# Patient Record
Sex: FEMALE | Race: WHITE | NOT HISPANIC OR LATINO | Employment: FULL TIME | URBAN - METROPOLITAN AREA
[De-identification: names, ages, dates, MRNs, and addresses within clinical notes are randomized per-mention and may not be internally consistent; named-entity substitution may affect disease eponyms.]

---

## 2023-11-27 ENCOUNTER — OFFICE VISIT (OUTPATIENT)
Dept: OBSTETRICS AND GYNECOLOGY | Facility: CLINIC | Age: 53
End: 2023-11-27
Payer: COMMERCIAL

## 2023-11-27 VITALS — DIASTOLIC BLOOD PRESSURE: 64 MMHG | WEIGHT: 210 LBS | SYSTOLIC BLOOD PRESSURE: 110 MMHG | BODY MASS INDEX: 34.95 KG/M2

## 2023-11-27 DIAGNOSIS — N95.0 PMB (POSTMENOPAUSAL BLEEDING): Primary | ICD-10-CM

## 2023-11-27 PROCEDURE — 88305 TISSUE EXAM BY PATHOLOGIST: CPT | Performed by: PATHOLOGY

## 2023-11-27 PROCEDURE — 99213 OFFICE O/P EST LOW 20 MIN: CPT | Performed by: OBSTETRICS & GYNECOLOGY

## 2023-11-27 PROCEDURE — 58100 BIOPSY OF UTERUS LINING: CPT | Performed by: OBSTETRICS & GYNECOLOGY

## 2023-11-27 PROCEDURE — 88305 TISSUE EXAM BY PATHOLOGIST: CPT

## 2023-11-27 ASSESSMENT — ENCOUNTER SYMPTOMS
UNEXPECTED WEIGHT CHANGE: 0
APPETITE CHANGE: 0
FLANK PAIN: 0
ABDOMINAL PAIN: 0
NAUSEA: 0
DYSURIA: 0
FATIGUE: 0
SLEEP DISTURBANCE: 0
FREQUENCY: 0
VOMITING: 0
BLOOD IN STOOL: 0
SHORTNESS OF BREATH: 0
COLOR CHANGE: 0
CONSTIPATION: 0
CHILLS: 0
DIARRHEA: 0
ABDOMINAL DISTENTION: 0
FEVER: 0
BACK PAIN: 0
HEMATURIA: 0

## 2023-11-27 ASSESSMENT — PAIN SCALES - GENERAL: PAINLEVEL: 0-NO PAIN

## 2023-11-27 NOTE — PROGRESS NOTES
Leyla Barillas is a 52 y.o.  here for vaginal bleeding / postmenopausal bleeding    HPI:  Pt has h/o LCIS which was diagnosed after breast reconstruction surgery. She has been on tamoxifen for 5 years.  Recently stopped in May.  Last week had one day of light bleeding and then this weekend has had 3 days like a full period.  Denies pain or other change.     Menopause . No change to menopausal symptoms.  LMP .       Past med hx and past surg hx reviewed and notable for: nerve impingement over the summer - s/p oral prednisone and gabapentin      Obstetric History  OB History    Para Term  AB Living   4 4 4     4   SAB IAB Ectopic Multiple Live Births           4      # Outcome Date GA Lbr Josh/2nd Weight Sex Delivery Anes PTL Lv   4 Term 06    F Vag-Spont   TOSHIA   3 Term 03    M Vag-Spont   TOSHIA   2 Term 00    F Vag-Spont   TOSHIA   1 Term 98    M Vag-Spont   TOSHIA        Past Medical History  She has no past medical history on file.    Surgical History  She has a past surgical history that includes Other surgical history (2015); Tonsillectomy (2015); Foot surgery (2015); and Breast surgery (2018).     Social History  She reports that she has never smoked. She has never used smokeless tobacco. She reports current alcohol use. She reports that she does not use drugs.    Family History  Family History   Problem Relation Name Age of Onset    Diabetes Mother      Breast cancer Paternal Grandmother           /64   Wt 95.3 kg (210 lb)   BMI 34.95 kg/m²   No LMP recorded.    Review of Systems   Constitutional:  Negative for appetite change, chills, fatigue, fever and unexpected weight change.   Respiratory:  Negative for shortness of breath.    Cardiovascular:  Negative for chest pain.   Gastrointestinal:  Negative for abdominal distention, abdominal pain, blood in stool, constipation, diarrhea, nausea and vomiting.   Endocrine: Negative for cold  intolerance and heat intolerance.   Genitourinary:  Positive for vaginal bleeding. Negative for dyspareunia, dysuria, flank pain, frequency, genital sores, hematuria, menstrual problem, pelvic pain, urgency, vaginal discharge and vaginal pain.   Musculoskeletal:  Negative for back pain.   Skin:  Negative for color change.   Psychiatric/Behavioral:  Negative for sleep disturbance.        Physical Exam  Constitutional:       Appearance: Normal appearance. She is normal weight.   Abdominal:      General: Abdomen is flat.      Palpations: Abdomen is soft.      Tenderness: There is no abdominal tenderness.   Genitourinary:     General: Normal vulva.      Vagina: Bleeding present.      Cervix: Normal.   Neurological:      Mental Status: She is alert.   Psychiatric:         Mood and Affect: Mood normal.         Behavior: Behavior normal.         Thought Content: Thought content normal.         Judgment: Judgment normal.       Patient ID: Leyla Barillas is a 52 y.o. female.    Endometrial biopsy    Date/Time: 11/27/2023 9:30 AM    Performed by: Erendira FITCH MD  Authorized by: Erendira FITCH MD    Consent:     Consent obtained: written    Consent given by: patient    Risks discussed: bleeding, infection and pain    Alternatives discussed: observation    Patient agrees, verbalizes understanding, and wants to proceed: yes    Indications:     Indications: postmenopausal bleeding      Progression of post-menopausal bleeding: worsening  Pre-procedure:     Urine pregnancy test: N/A    Procedure:     Tenaculum used: yes      Cervix dilated: no    Findings:     Cervix: normal      Uterus depth by sound (cm): 9    Specimen collected: specimen collected and sent to pathology    Comments:     Procedure comments: Specimen with significant amount of blood d/t ongoing vaginal bleeding      Assessment and Plan:    PMB   Pt is s/p tamoxifen and at increased risk of uterine cancer.   Discussed possible findings (hyperplasia, benign  polyp, atrophy (unlikely) or endometrial cancer). Recommend biopsy. Pt agreeable and wanted to move forward with biopsy today.   Biopsy easily completed.    TATV US ordered to assess for polyps.  Will follow up on biopsy and imaging and discuss plan of care with patient.     12/14/23 - Reviewed pathology results with patient.  Recommend hysteroscopy, D&C for further evaluation. Pt agreeable. Will work on scheduling.

## 2023-11-28 ENCOUNTER — HOSPITAL ENCOUNTER (OUTPATIENT)
Dept: RADIOLOGY | Facility: HOSPITAL | Age: 53
Discharge: HOME | End: 2023-11-28
Payer: COMMERCIAL

## 2023-11-28 DIAGNOSIS — N95.0 PMB (POSTMENOPAUSAL BLEEDING): ICD-10-CM

## 2023-11-28 PROCEDURE — 76830 TRANSVAGINAL US NON-OB: CPT | Performed by: RADIOLOGY

## 2023-11-28 PROCEDURE — 76856 US EXAM PELVIC COMPLETE: CPT

## 2023-11-28 PROCEDURE — 76857 US EXAM PELVIC LIMITED: CPT | Performed by: RADIOLOGY

## 2023-12-07 DIAGNOSIS — Z86.000 HISTORY OF LOBULAR CARCINOMA IN SITU (LCIS) OF BREAST: Primary | ICD-10-CM

## 2023-12-11 ENCOUNTER — ANCILLARY PROCEDURE (OUTPATIENT)
Dept: RADIOLOGY | Facility: CLINIC | Age: 53
End: 2023-12-11
Payer: COMMERCIAL

## 2023-12-11 DIAGNOSIS — D05.01 LOBULAR CARCINOMA IN SITU OF RIGHT BREAST: ICD-10-CM

## 2023-12-11 DIAGNOSIS — R92.8 OTHER ABNORMAL AND INCONCLUSIVE FINDINGS ON DIAGNOSTIC IMAGING OF BREAST: Primary | ICD-10-CM

## 2023-12-11 PROCEDURE — 2550000001 HC RX 255 CONTRASTS: Performed by: NURSE PRACTITIONER

## 2023-12-11 PROCEDURE — 77049 MRI BREAST C-+ W/CAD BI: CPT

## 2023-12-11 PROCEDURE — A9575 INJ GADOTERATE MEGLUMI 0.1ML: HCPCS | Performed by: NURSE PRACTITIONER

## 2023-12-11 PROCEDURE — 77049 MRI BREAST C-+ W/CAD BI: CPT | Mod: BILATERAL PROCEDURE | Performed by: STUDENT IN AN ORGANIZED HEALTH CARE EDUCATION/TRAINING PROGRAM

## 2023-12-11 RX ORDER — GADOTERATE MEGLUMINE 376.9 MG/ML
20 INJECTION INTRAVENOUS
Status: COMPLETED | OUTPATIENT
Start: 2023-12-11 | End: 2023-12-11

## 2023-12-11 RX ADMIN — GADOTERATE MEGLUMINE 20 ML: 376.9 INJECTION INTRAVENOUS at 12:50

## 2023-12-12 LAB
LABORATORY COMMENT REPORT: NORMAL
PATH REPORT.FINAL DX SPEC: NORMAL
PATH REPORT.GROSS SPEC: NORMAL
PATH REPORT.RELEVANT HX SPEC: NORMAL
PATH REPORT.TOTAL CANCER: NORMAL

## 2023-12-14 ENCOUNTER — PREP FOR PROCEDURE (OUTPATIENT)
Dept: OBSTETRICS AND GYNECOLOGY | Facility: HOSPITAL | Age: 53
End: 2023-12-14
Payer: COMMERCIAL

## 2023-12-14 DIAGNOSIS — N95.0 POSTMENOPAUSAL BLEEDING: Primary | ICD-10-CM

## 2023-12-20 PROBLEM — N95.0 POSTMENOPAUSAL BLEEDING: Status: ACTIVE | Noted: 2023-12-14

## 2023-12-26 ENCOUNTER — LAB (OUTPATIENT)
Dept: LAB | Facility: LAB | Age: 53
End: 2023-12-26
Payer: COMMERCIAL

## 2023-12-26 DIAGNOSIS — N95.0 POSTMENOPAUSAL BLEEDING: ICD-10-CM

## 2023-12-26 LAB
ERYTHROCYTE [DISTWIDTH] IN BLOOD BY AUTOMATED COUNT: 13.3 % (ref 11.5–14.5)
HCT VFR BLD AUTO: 39.2 % (ref 36–46)
HGB BLD-MCNC: 13.3 G/DL (ref 12–16)
MCH RBC QN AUTO: 32.1 PG (ref 26–34)
MCHC RBC AUTO-ENTMCNC: 33.9 G/DL (ref 32–36)
MCV RBC AUTO: 95 FL (ref 80–100)
NRBC BLD-RTO: 0 /100 WBCS (ref 0–0)
PLATELET # BLD AUTO: 235 X10*3/UL (ref 150–450)
RBC # BLD AUTO: 4.14 X10*6/UL (ref 4–5.2)
WBC # BLD AUTO: 6.1 X10*3/UL (ref 4.4–11.3)

## 2023-12-26 PROCEDURE — 85027 COMPLETE CBC AUTOMATED: CPT

## 2023-12-26 PROCEDURE — 36415 COLL VENOUS BLD VENIPUNCTURE: CPT

## 2023-12-27 ENCOUNTER — PREP FOR PROCEDURE (OUTPATIENT)
Dept: OBSTETRICS AND GYNECOLOGY | Facility: HOSPITAL | Age: 53
End: 2023-12-27

## 2023-12-27 ENCOUNTER — OFFICE VISIT (OUTPATIENT)
Dept: OBSTETRICS AND GYNECOLOGY | Facility: CLINIC | Age: 53
End: 2023-12-27
Payer: COMMERCIAL

## 2023-12-27 VITALS
BODY MASS INDEX: 35.51 KG/M2 | WEIGHT: 208 LBS | SYSTOLIC BLOOD PRESSURE: 104 MMHG | HEIGHT: 64 IN | DIASTOLIC BLOOD PRESSURE: 71 MMHG

## 2023-12-27 DIAGNOSIS — N95.0 PMB (POSTMENOPAUSAL BLEEDING): ICD-10-CM

## 2023-12-27 DIAGNOSIS — R93.89 THICKENED ENDOMETRIUM: Primary | ICD-10-CM

## 2023-12-27 PROCEDURE — 1036F TOBACCO NON-USER: CPT | Performed by: OBSTETRICS & GYNECOLOGY

## 2023-12-27 PROCEDURE — 99213 OFFICE O/P EST LOW 20 MIN: CPT | Performed by: OBSTETRICS & GYNECOLOGY

## 2023-12-27 RX ORDER — MELOXICAM 15 MG/1
15 TABLET ORAL AS NEEDED
COMMUNITY

## 2023-12-27 ASSESSMENT — ENCOUNTER SYMPTOMS
FLANK PAIN: 0
DIARRHEA: 0
FREQUENCY: 0
HEMATURIA: 0
BLOOD IN STOOL: 0
BACK PAIN: 0
DYSURIA: 0
VOMITING: 0
CONSTIPATION: 0
SHORTNESS OF BREATH: 0
ABDOMINAL PAIN: 0
FEVER: 0
SLEEP DISTURBANCE: 0
UNEXPECTED WEIGHT CHANGE: 0
ABDOMINAL DISTENTION: 0
FATIGUE: 0
APPETITE CHANGE: 0
NAUSEA: 0
CHILLS: 0
COLOR CHANGE: 0

## 2023-12-27 ASSESSMENT — PAIN SCALES - GENERAL: PAINLEVEL: 0-NO PAIN

## 2023-12-27 NOTE — PROGRESS NOTES
"Leyla Barillas is a 53 y.o.  here for pre op visit     HPI: Pt has h/o LCIS which was diagnosed after breast reconstruction surgery. She has been on tamoxifen for 5 years.  Recently stopped in May.  In November had several days of bleeding like a full period.  EMBx the following week showed proliferative endometrium and US showed 16 mm endometrial lining.  Pt has not had a period since .     Today is here to discuss hysteroscopy with excision of tissue in the OR for further evaluation.   Pt reports no problems with anesthesia in the past.     Obstetric History  OB History    Para Term  AB Living   4 4 4     4   SAB IAB Ectopic Multiple Live Births           4      # Outcome Date GA Lbr Josh/2nd Weight Sex Delivery Anes PTL Lv   4 Term 06    F Vag-Spont   TOSHIA   3 Term 03    M Vag-Spont   TOSHIA   2 Term 00    F Vag-Spont   TOSHIA   1 Term 98    M Vag-Spont   TOSHIA        Past Medical History  She has no past medical history on file.    Surgical History  She has a past surgical history that includes Other surgical history (2015); Tonsillectomy (2015); Foot surgery (2015); and Breast surgery (2018).     Social History  She reports that she has never smoked. She has never used smokeless tobacco. She reports current alcohol use. She reports that she does not use drugs.    Family History  Family History   Problem Relation Name Age of Onset    Diabetes Mother      Breast cancer Paternal Grandmother           /71   Ht 1.626 m (5' 4\")   Wt 94.3 kg (208 lb)   LMP 2021   BMI 35.70 kg/m²   Patient's last menstrual period was 2021.    Review of Systems   Constitutional:  Negative for appetite change, chills, fatigue, fever and unexpected weight change.   Respiratory:  Negative for shortness of breath.    Cardiovascular:  Negative for chest pain.   Gastrointestinal:  Negative for abdominal distention, abdominal pain, blood in stool, constipation, " diarrhea, nausea and vomiting.   Endocrine: Negative for cold intolerance and heat intolerance.   Genitourinary:  Negative for dyspareunia, dysuria, flank pain, frequency, genital sores, hematuria, menstrual problem, pelvic pain, urgency, vaginal bleeding, vaginal discharge and vaginal pain.   Musculoskeletal:  Negative for back pain.   Skin:  Negative for color change.   Psychiatric/Behavioral:  Negative for sleep disturbance.        Physical Exam  Constitutional:       Appearance: Normal appearance.   Skin:     General: Skin is warm and dry.   Neurological:      Mental Status: She is alert.   Psychiatric:         Mood and Affect: Mood normal.         Behavior: Behavior normal.           Assessment and Plan:    Discussed hysteroscopy with dilation and curettage procedure - a small camera is passed through cervix into the uterus using normal saline for distention and visualization and at the end a scraping of the lining is  completed for sampling.  Discussed risks including but not limited to, infection, blood loss, uterine perforation.  Pt aware to expect some minor cramping and spotting/bleeding after the procedure which can last up to two weeks.   Pt expressed understanding and all questions answered. Consent was signed.

## 2023-12-30 ENCOUNTER — ANESTHESIA EVENT (OUTPATIENT)
Dept: OPERATING ROOM | Facility: HOSPITAL | Age: 53
End: 2023-12-30
Payer: COMMERCIAL

## 2023-12-30 RX ORDER — OXYCODONE HYDROCHLORIDE 5 MG/1
5 TABLET ORAL EVERY 4 HOURS PRN
Status: CANCELLED | OUTPATIENT
Start: 2023-12-30

## 2023-12-30 RX ORDER — ALBUTEROL SULFATE 0.83 MG/ML
2.5 SOLUTION RESPIRATORY (INHALATION) ONCE AS NEEDED
Status: CANCELLED | OUTPATIENT
Start: 2023-12-30

## 2023-12-30 RX ORDER — ONDANSETRON HYDROCHLORIDE 2 MG/ML
4 INJECTION, SOLUTION INTRAVENOUS ONCE AS NEEDED
Status: CANCELLED | OUTPATIENT
Start: 2023-12-30

## 2023-12-30 RX ORDER — DIPHENHYDRAMINE HYDROCHLORIDE 50 MG/ML
12.5 INJECTION INTRAMUSCULAR; INTRAVENOUS ONCE AS NEEDED
Status: CANCELLED | OUTPATIENT
Start: 2023-12-30

## 2023-12-30 RX ORDER — HYDRALAZINE HYDROCHLORIDE 20 MG/ML
5 INJECTION INTRAMUSCULAR; INTRAVENOUS EVERY 30 MIN PRN
Status: CANCELLED | OUTPATIENT
Start: 2023-12-30

## 2023-12-30 RX ORDER — SODIUM CHLORIDE, SODIUM LACTATE, POTASSIUM CHLORIDE, CALCIUM CHLORIDE 600; 310; 30; 20 MG/100ML; MG/100ML; MG/100ML; MG/100ML
100 INJECTION, SOLUTION INTRAVENOUS CONTINUOUS
Status: CANCELLED | OUTPATIENT
Start: 2023-12-30

## 2024-01-02 ENCOUNTER — ANESTHESIA (OUTPATIENT)
Dept: OPERATING ROOM | Facility: HOSPITAL | Age: 54
End: 2024-01-02
Payer: COMMERCIAL

## 2024-01-02 ENCOUNTER — HOSPITAL ENCOUNTER (OUTPATIENT)
Facility: HOSPITAL | Age: 54
Setting detail: OUTPATIENT SURGERY
Discharge: HOME | End: 2024-01-02
Attending: OBSTETRICS & GYNECOLOGY | Admitting: OBSTETRICS & GYNECOLOGY
Payer: COMMERCIAL

## 2024-01-02 VITALS
HEART RATE: 65 BPM | BODY MASS INDEX: 36.02 KG/M2 | HEIGHT: 64 IN | WEIGHT: 210.98 LBS | DIASTOLIC BLOOD PRESSURE: 61 MMHG | SYSTOLIC BLOOD PRESSURE: 100 MMHG | OXYGEN SATURATION: 97 % | RESPIRATION RATE: 12 BRPM | TEMPERATURE: 97.9 F

## 2024-01-02 DIAGNOSIS — N95.0 POSTMENOPAUSAL BLEEDING: Primary | ICD-10-CM

## 2024-01-02 PROCEDURE — 2500000004 HC RX 250 GENERAL PHARMACY W/ HCPCS (ALT 636 FOR OP/ED): Performed by: NURSE ANESTHETIST, CERTIFIED REGISTERED

## 2024-01-02 PROCEDURE — A58558 PR HYSTEROSCOPY,W/ENDO BX: Performed by: NURSE ANESTHETIST, CERTIFIED REGISTERED

## 2024-01-02 PROCEDURE — 7100000002 HC RECOVERY ROOM TIME - EACH INCREMENTAL 1 MINUTE: Performed by: OBSTETRICS & GYNECOLOGY

## 2024-01-02 PROCEDURE — 88305 TISSUE EXAM BY PATHOLOGIST: CPT | Performed by: STUDENT IN AN ORGANIZED HEALTH CARE EDUCATION/TRAINING PROGRAM

## 2024-01-02 PROCEDURE — 7100000010 HC PHASE TWO TIME - EACH INCREMENTAL 1 MINUTE: Performed by: OBSTETRICS & GYNECOLOGY

## 2024-01-02 PROCEDURE — 88305 TISSUE EXAM BY PATHOLOGIST: CPT | Mod: TC,SUR,AHULAB | Performed by: OBSTETRICS & GYNECOLOGY

## 2024-01-02 PROCEDURE — 2500000005 HC RX 250 GENERAL PHARMACY W/O HCPCS: Performed by: NURSE ANESTHETIST, CERTIFIED REGISTERED

## 2024-01-02 PROCEDURE — A58558 PR HYSTEROSCOPY,W/ENDO BX: Performed by: ANESTHESIOLOGY

## 2024-01-02 PROCEDURE — 58558 HYSTEROSCOPY BIOPSY: CPT | Performed by: OBSTETRICS & GYNECOLOGY

## 2024-01-02 PROCEDURE — 3700000001 HC GENERAL ANESTHESIA TIME - INITIAL BASE CHARGE: Performed by: OBSTETRICS & GYNECOLOGY

## 2024-01-02 PROCEDURE — 3600000008 HC OR TIME - EACH INCREMENTAL 1 MINUTE - PROCEDURE LEVEL THREE: Performed by: OBSTETRICS & GYNECOLOGY

## 2024-01-02 PROCEDURE — 2500000005 HC RX 250 GENERAL PHARMACY W/O HCPCS: Performed by: OBSTETRICS & GYNECOLOGY

## 2024-01-02 PROCEDURE — 2720000007 HC OR 272 NO HCPCS: Performed by: OBSTETRICS & GYNECOLOGY

## 2024-01-02 PROCEDURE — 3600000003 HC OR TIME - INITIAL BASE CHARGE - PROCEDURE LEVEL THREE: Performed by: OBSTETRICS & GYNECOLOGY

## 2024-01-02 PROCEDURE — 7100000001 HC RECOVERY ROOM TIME - INITIAL BASE CHARGE: Performed by: OBSTETRICS & GYNECOLOGY

## 2024-01-02 PROCEDURE — 7100000009 HC PHASE TWO TIME - INITIAL BASE CHARGE: Performed by: OBSTETRICS & GYNECOLOGY

## 2024-01-02 PROCEDURE — 3700000002 HC GENERAL ANESTHESIA TIME - EACH INCREMENTAL 1 MINUTE: Performed by: OBSTETRICS & GYNECOLOGY

## 2024-01-02 RX ORDER — FENTANYL CITRATE 50 UG/ML
INJECTION, SOLUTION INTRAMUSCULAR; INTRAVENOUS AS NEEDED
Status: DISCONTINUED | OUTPATIENT
Start: 2024-01-02 | End: 2024-01-02

## 2024-01-02 RX ORDER — PROPOFOL 10 MG/ML
INJECTION, EMULSION INTRAVENOUS AS NEEDED
Status: DISCONTINUED | OUTPATIENT
Start: 2024-01-02 | End: 2024-01-02

## 2024-01-02 RX ORDER — KETOROLAC TROMETHAMINE 30 MG/ML
INJECTION, SOLUTION INTRAMUSCULAR; INTRAVENOUS AS NEEDED
Status: DISCONTINUED | OUTPATIENT
Start: 2024-01-02 | End: 2024-01-02

## 2024-01-02 RX ORDER — ACETAMINOPHEN 325 MG/1
975 TABLET ORAL ONCE
Status: COMPLETED | OUTPATIENT
Start: 2024-01-02 | End: 2024-01-02

## 2024-01-02 RX ORDER — DEXAMETHASONE SODIUM PHOSPHATE 4 MG/ML
INJECTION, SOLUTION INTRA-ARTICULAR; INTRALESIONAL; INTRAMUSCULAR; INTRAVENOUS; SOFT TISSUE AS NEEDED
Status: DISCONTINUED | OUTPATIENT
Start: 2024-01-02 | End: 2024-01-02

## 2024-01-02 RX ORDER — MIDAZOLAM HYDROCHLORIDE 1 MG/ML
INJECTION INTRAMUSCULAR; INTRAVENOUS AS NEEDED
Status: DISCONTINUED | OUTPATIENT
Start: 2024-01-02 | End: 2024-01-02

## 2024-01-02 RX ORDER — ONDANSETRON HYDROCHLORIDE 2 MG/ML
INJECTION, SOLUTION INTRAVENOUS AS NEEDED
Status: DISCONTINUED | OUTPATIENT
Start: 2024-01-02 | End: 2024-01-02

## 2024-01-02 RX ORDER — LIDOCAINE HYDROCHLORIDE 20 MG/ML
INJECTION, SOLUTION INFILTRATION; PERINEURAL AS NEEDED
Status: DISCONTINUED | OUTPATIENT
Start: 2024-01-02 | End: 2024-01-02

## 2024-01-02 RX ADMIN — PROPOFOL 150 MG: 10 INJECTION, EMULSION INTRAVENOUS at 09:32

## 2024-01-02 RX ADMIN — LIDOCAINE HYDROCHLORIDE 50 MG: 20 INJECTION, SOLUTION INFILTRATION; PERINEURAL at 09:32

## 2024-01-02 RX ADMIN — MIDAZOLAM HYDROCHLORIDE 2 MG: 1 INJECTION, SOLUTION INTRAMUSCULAR; INTRAVENOUS at 09:28

## 2024-01-02 RX ADMIN — DEXAMETHASONE SODIUM PHOSPHATE 4 MG: 4 INJECTION, SOLUTION INTRAMUSCULAR; INTRAVENOUS at 09:42

## 2024-01-02 RX ADMIN — ONDANSETRON 4 MG: 2 INJECTION INTRAMUSCULAR; INTRAVENOUS at 09:42

## 2024-01-02 RX ADMIN — ACETAMINOPHEN 975 MG: 325 TABLET ORAL at 07:50

## 2024-01-02 RX ADMIN — KETOROLAC TROMETHAMINE 30 MG: 30 INJECTION INTRAMUSCULAR; INTRAVENOUS at 10:01

## 2024-01-02 RX ADMIN — FENTANYL CITRATE 100 MCG: 50 INJECTION, SOLUTION INTRAMUSCULAR; INTRAVENOUS at 09:28

## 2024-01-02 RX ADMIN — PROPOFOL 50 MG: 10 INJECTION, EMULSION INTRAVENOUS at 10:01

## 2024-01-02 RX ADMIN — SODIUM CHLORIDE, SODIUM LACTATE, POTASSIUM CHLORIDE, AND CALCIUM CHLORIDE: 600; 310; 30; 20 INJECTION, SOLUTION INTRAVENOUS at 09:28

## 2024-01-02 SDOH — HEALTH STABILITY: MENTAL HEALTH: CURRENT SMOKER: 0

## 2024-01-02 ASSESSMENT — PAIN - FUNCTIONAL ASSESSMENT
PAIN_FUNCTIONAL_ASSESSMENT: 0-10

## 2024-01-02 ASSESSMENT — COLUMBIA-SUICIDE SEVERITY RATING SCALE - C-SSRS
2. HAVE YOU ACTUALLY HAD ANY THOUGHTS OF KILLING YOURSELF?: NO
6. HAVE YOU EVER DONE ANYTHING, STARTED TO DO ANYTHING, OR PREPARED TO DO ANYTHING TO END YOUR LIFE?: NO
1. IN THE PAST MONTH, HAVE YOU WISHED YOU WERE DEAD OR WISHED YOU COULD GO TO SLEEP AND NOT WAKE UP?: NO

## 2024-01-02 ASSESSMENT — PAIN SCALES - GENERAL
PAINLEVEL_OUTOF10: 0 - NO PAIN

## 2024-01-02 NOTE — ANESTHESIA PROCEDURE NOTES
Airway  Date/Time: 1/2/2024 9:35 AM  Urgency: elective    Airway not difficult    Staffing  Performed: CRNA   Authorized by: David Velasquez MD    Performed by: LUCINA Quintanilla-AMALIA  Patient location during procedure: OR    Indications and Patient Condition  Indications for airway management: anesthesia  Spontaneous Ventilation: absent  Sedation level: deep  Preoxygenated: yes  Patient position: sniffing  Mask difficulty assessment: 1 - vent by mask    Final Airway Details  Final airway type: supraglottic airway      Successful airway: Supreme  Size 4     Number of attempts at approach: 2

## 2024-01-02 NOTE — NURSING NOTE
1045: Pt to phase 2 and assisted to get dressed at this time. Pt family called to bedside.    1050: Pt family at bedside and dc instruction reviewed with pt and family. Pt and family expressed understanding of all instruction provided.     1100: Pt assisted to bathroom and able to void at this time.     1105: IV removed and transport arrived for pt discharge.

## 2024-01-02 NOTE — ANESTHESIA PREPROCEDURE EVALUATION
Patient: Leyla Barillas    Procedure Information       Date/Time: 01/02/24 0930    Procedure: Hysteroscopy with Excision Tissue    Location: [facility] OR 01 / [facility] OR    Surgeons: Erendira FITCH MD            Relevant Problems   Anesthesia (within normal limits)      Cardiovascular (within normal limits)      Endocrine (within normal limits)      GI (within normal limits)      /Renal (within normal limits)      Neuro/Psych (within normal limits)      Pulmonary (within normal limits)      GI/Hepatic (within normal limits)      Hematology (within normal limits)      Musculoskeletal (within normal limits)      Eyes, Ears, Nose, and Throat (within normal limits)      Infectious Disease (within normal limits)       Clinical information reviewed:   Tobacco  Allergies  Meds   Med Hx  Surg Hx  OB Status  Fam Hx  Soc   Hx        NPO Detail:  NPO/Void Status  Carbonhydrate Drink Given Prior to Surgery? : N  Date of Last Liquid: 01/01/24  Time of Last Liquid: 2000  Date of Last Solid: 01/01/24  Time of Last Solid: 2000  Last Intake Type: Clear fluids  Time of Last Void: 0724         Physical Exam    Airway  Mallampati: IV  TM distance: >3 FB     Cardiovascular    Dental    Pulmonary    Abdominal      Other findings: Small mouth opening due to TMJ      Anesthesia Plan    ASA 1     general     The patient is not a current smoker.    intravenous induction   Anesthetic plan and risks discussed with patient.    Plan discussed with CRNA.

## 2024-01-02 NOTE — INTERVAL H&P NOTE
"Interval Obstetrical Admission History and Physical    Patient Description:  Leyla Barillas is a 53 y.o.  gravid, female.       Laboratory: I have reviewed pertinent lab studies.     Physical Exam:  /65   Pulse 66   Temp 36.8 °C (98.2 °F) (Temporal)   Resp 16   Ht 1.626 m (5' 4\")   Wt 95.7 kg (210 lb 15.7 oz)   BMI 36.21 kg/m²   Alert and oriented, NAD  Abd soft nontender, nondistended  Normal behavior, normal mood    There is no change in physical condition since the previously submitted Admission History and Physical Examination.    Plans: Plan for hysteroscopy, D&C, possible excision of polyp or other tissue.    All questions have been answered to the patient's satisfaction.      "

## 2024-01-02 NOTE — DISCHARGE SUMMARY
Discharge Diagnosis  Postmenopausal bleeding    Issues Requiring Follow-Up  Postop visit     Test Results Pending At Discharge  Pending Labs       Order Current Status    Surgical Pathology Exam In process            Hospital Course   Procedure as described        Home Medications     Medication List      ASK your doctor about these medications     meloxicam 15 mg tablet; Commonly known as: Mobic       Outpatient Follow-Up  Future Appointments   Date Time Provider Department Center   7/19/2024 10:00 AM [facility] [id number] [facility] [place]   7/29/2024  9:00 AM LUCINA Braswell-CNP [id number] Ken Redmond MD

## 2024-01-02 NOTE — DISCHARGE INSTRUCTIONS
Advance diet as tolerated.   Return to normal activity as tolerated.   No driving for 24 hours after anesthesia.   Shower ok today.  Avoid tub soaks x 1 week.   Avoid intercourse or anything in the vagina x 1 week.   OK to return to work tomorrow 1/3/24.   Call your doctor if you have fever/chills, foul smelling vaginal discharge or vaginal bleeding which soaks through more than one pad per hour.

## 2024-01-02 NOTE — ANESTHESIA POSTPROCEDURE EVALUATION
Patient: Leyla Barillas    Procedure Summary       Date: 01/02/24 Room / Location: [facility] A OR 01 / Virtual [facility] A OR    Anesthesia Start: 0928 Anesthesia Stop: 1012    Procedure: Hysteroscopy with Excision Tissue Diagnosis:       Postmenopausal bleeding      (Postmenopausal bleeding [N95.0])    Surgeons: Erendira FITCH MD Responsible Provider: David Velasquez MD    Anesthesia Type: general ASA Status: 1            Anesthesia Type: general    Vitals Value Taken Time   /60 01/02/24 1030   Temp 36 °C (96.8 °F) 01/02/24 1008   Pulse 72 01/02/24 1030   Resp 16 01/02/24 1030   SpO2 97 % 01/02/24 1030       Anesthesia Post Evaluation    Patient location during evaluation: PACU  Patient participation: complete - patient participated  Level of consciousness: awake  Pain management: adequate  Multimodal analgesia pain management approach  Airway patency: patent  Cardiovascular status: acceptable  Respiratory status: acceptable  Hydration status: acceptable  Postoperative Nausea and Vomiting: none  Comments: Will continue to assess PONV status.    No notable events documented.

## 2024-01-02 NOTE — OP NOTE
Hysteroscopy with Excision Tissue Operative Note     Date: 2024  OR Location: [facility] OR    Name: Leyla Barillas, : 1970, Age: 53 y.o., MRN: [id number], Sex: female    Diagnosis  Pre-op Diagnosis     * Postmenopausal bleeding [N95.0] Post-op Diagnosis     * Postmenopausal bleeding [N95.0]     Procedures  Hysteroscopy with Excision Tissue  03313 - AL HYSTEROSCOPY BX ENDOMETRIUM&/POLYPC W/WO D&C      Surgeons      * Erendira Redmond V - Primary    Resident/Fellow/Other Assistant:  Surgeon(s) and Role:    Procedure Summary  Anesthesia: General  ASA: I  Anesthesia Staff: Anesthesiologist: David Velasquez MD  CRNA: FRANCO LeCRNA; AURY Quintanilla  Estimated Blood Loss: 10 mL  Intra-op Medications:   Medication Name Total Dose   lidocaine-epinephrine PF (Xylocaine W/EPI) 1 %-1:200,000 injection 9 mL              Anesthesia Record               Intraprocedure I/O Totals       None           Specimen:   ID Type Source Tests Collected by Time   1 : ENDOMETRIAL SAMPLING Tissue ENDOMETRIUM CURETTINGS SURGICAL PATHOLOGY EXAM Erendira FITCH MD 2024 1000        Staff:   Circulator: Ela Moy RN  Scrub Person: Esha Wang RN         Drains and/or Catheters: * None in log *    Tourniquet Times:         Implants:     Findings: normal size uterus with large amount of proliferative tissue    Indications: Leyla Barillas is an 53 y.o. female who is having surgery for Postmenopausal bleeding [N95.0].     The patient was seen in the preoperative area. The risks, benefits, complications, treatment options, non-operative alternatives, expected recovery and outcomes were discussed with the patient. The possibilities of reaction to medication, pulmonary aspiration, injury to surrounding structures, bleeding, recurrent infection, the need for additional procedures, failure to diagnose a condition, and creating a complication requiring transfusion or operation were discussed with the patient. The  patient concurred with the proposed plan, giving informed consent.  The site of surgery was properly noted/marked if necessary per policy. The patient has been actively warmed in preoperative area. Preoperative antibiotics are not indicated. Venous thrombosis prophylaxis have been ordered including bilateral sequential compression devices    Procedure Details: Pt was taken to the OR where anesthesia was administered. She was then placed in the dorsal lithotomy position. An exam under anesthesia revealed a normal uterus. She was then prepped and draped in the usual sterile fashion. A weighted speculum was placed in the vagina and the cervix visualized.  A long aaron was used to grasp the anterior lip of the cervix. Hernandez dilators were used to dilate the cervix. The Aveta hysteroscope was placed inside the cervix and advanced to visualize the endometrial cavity using normal saline, which appeared normal in size with large amounts of proliferative tissue. The bilateral ostia were visualized.  The Aveta resection device was then advanced through the hysteroscope and used to resect the endometrial tissue in all areas.  On completion the cavity appeared normal.  Fluid deficit was 100 mL.  The scope and resector were removed.  A sharp curettage was then done until a gritty texture was noted.    The tenaculum was removed and site was hemostatic with minimal pressure.  All instruments were then removed from vagina.  All counts were correct, and the patient tolerated the procedure well.  The patient was taken to PACU in stable condition.    Complications:  None; patient tolerated the procedure well.    Disposition: PACU - hemodynamically stable.  Condition: stable         Additional Details: none    Attending Attestation:     Erendira Redmond V  Phone Number: [phone]

## 2024-01-03 ASSESSMENT — PAIN SCALES - GENERAL: PAINLEVEL_OUTOF10: 0 - NO PAIN

## 2024-01-05 LAB
LABORATORY COMMENT REPORT: NORMAL
PATH REPORT.COMMENTS IMP SPEC: NORMAL
PATH REPORT.FINAL DX SPEC: NORMAL
PATH REPORT.GROSS SPEC: NORMAL
PATH REPORT.RELEVANT HX SPEC: NORMAL
PATH REPORT.TOTAL CANCER: NORMAL

## 2024-01-15 ENCOUNTER — OFFICE VISIT (OUTPATIENT)
Dept: OBSTETRICS AND GYNECOLOGY | Facility: CLINIC | Age: 54
End: 2024-01-15
Payer: COMMERCIAL

## 2024-01-15 VITALS
DIASTOLIC BLOOD PRESSURE: 70 MMHG | WEIGHT: 212 LBS | SYSTOLIC BLOOD PRESSURE: 110 MMHG | BODY MASS INDEX: 36.19 KG/M2 | HEIGHT: 64 IN

## 2024-01-15 DIAGNOSIS — Z09 POSTOP CHECK: Primary | ICD-10-CM

## 2024-01-15 DIAGNOSIS — N85.02 ATYPICAL ENDOMETRIAL HYPERPLASIA: ICD-10-CM

## 2024-01-15 PROCEDURE — 1036F TOBACCO NON-USER: CPT | Performed by: OBSTETRICS & GYNECOLOGY

## 2024-01-15 PROCEDURE — 99213 OFFICE O/P EST LOW 20 MIN: CPT | Performed by: OBSTETRICS & GYNECOLOGY

## 2024-01-15 ASSESSMENT — ENCOUNTER SYMPTOMS
VOMITING: 0
HEMATURIA: 0
APPETITE CHANGE: 0
NAUSEA: 0
SHORTNESS OF BREATH: 0
ABDOMINAL DISTENTION: 0
CHILLS: 0
FEVER: 0
FREQUENCY: 0
BACK PAIN: 0
UNEXPECTED WEIGHT CHANGE: 0
DYSURIA: 0
COLOR CHANGE: 0
SLEEP DISTURBANCE: 0
ABDOMINAL PAIN: 0
CONSTIPATION: 0
FLANK PAIN: 0
DIARRHEA: 0
FATIGUE: 0
BLOOD IN STOOL: 0

## 2024-01-15 ASSESSMENT — PAIN SCALES - GENERAL: PAINLEVEL: 0-NO PAIN

## 2024-01-15 NOTE — PROGRESS NOTES
"Leyla Barillas is a 53 y.o.  here for post op    HPI: Pt is s/p hysteroscopy D&C earlier this month for PMB.  Pathology showed atypical endometrial hyperplasia.   Of significance, she has h/o LCIS which was diagnosed after breast reconstruction surgery.  She was on tamoxifen for 5 years and stopped in .     Pt reports no problems following surgery. Had some light vaginal bleeding for a few days but no other recovery concerns.     Obstetric History  OB History    Para Term  AB Living   4 4 4     4   SAB IAB Ectopic Multiple Live Births           4      # Outcome Date GA Lbr Josh/2nd Weight Sex Delivery Anes PTL Lv   4 Term 06    F Vag-Spont   TOSHIA   3 Term 03    M Vag-Spont   TOSHIA   2 Term 00    F Vag-Spont   TOSHIA   1 Term 98    M Vag-Spont   TOSHIA        Past Medical History  She has a past medical history of Arthritis.    Surgical History  She has a past surgical history that includes Other surgical history (2015); Tonsillectomy (2015); Foot surgery (2015); and Breast surgery (2018).     Social History  She reports that she has never smoked. She has never used smokeless tobacco. She reports current alcohol use. She reports that she does not use drugs.    Family History  Family History   Problem Relation Name Age of Onset    Diabetes Mother      Breast cancer Paternal Grandmother           /70   Ht 1.626 m (5' 4\")   Wt 96.2 kg (212 lb)   LMP 2021   BMI 36.39 kg/m²   Patient's last menstrual period was 2021.    Review of Systems   Constitutional:  Negative for appetite change, chills, fatigue, fever and unexpected weight change.   Respiratory:  Negative for shortness of breath.    Cardiovascular:  Negative for chest pain.   Gastrointestinal:  Negative for abdominal distention, abdominal pain, blood in stool, constipation, diarrhea, nausea and vomiting.   Endocrine: Negative for cold intolerance and heat intolerance.   Genitourinary: "  Negative for dyspareunia, dysuria, flank pain, frequency, genital sores, hematuria, menstrual problem, pelvic pain, urgency, vaginal bleeding, vaginal discharge and vaginal pain.   Musculoskeletal:  Negative for back pain.   Skin:  Negative for color change.   Psychiatric/Behavioral:  Negative for sleep disturbance.        Physical Exam  Constitutional:       Appearance: Normal appearance.   Neurological:      Mental Status: She is alert.   Psychiatric:         Mood and Affect: Mood normal.         Behavior: Behavior normal.           Assessment and Plan:    Surgery follow up  No problems with recovery  Reviewed pathology of atypical endometrial hyperplasia and risk of endometrial cancer (up to 40%).  Recommend hysterectomy for treatment.  Not a good candidate for progesterone therapy due to breast cancer risk, nor is progesterone the first line therapy in this situation.   Reviewed likely minimally invasive approach to surgery.  Recommended several Gyn surgeons in  system. Pt has appointment with Dr. Johnna Oliva 2/1/24.

## 2024-02-01 ENCOUNTER — OFFICE VISIT (OUTPATIENT)
Dept: OBSTETRICS AND GYNECOLOGY | Facility: CLINIC | Age: 54
End: 2024-02-01
Payer: COMMERCIAL

## 2024-02-01 VITALS
HEART RATE: 75 BPM | SYSTOLIC BLOOD PRESSURE: 115 MMHG | DIASTOLIC BLOOD PRESSURE: 80 MMHG | BODY MASS INDEX: 35.51 KG/M2 | WEIGHT: 208 LBS | HEIGHT: 64 IN

## 2024-02-01 DIAGNOSIS — N85.02 EIN (ENDOMETRIAL INTRAEPITHELIAL NEOPLASIA): Primary | ICD-10-CM

## 2024-02-01 DIAGNOSIS — Z86.000 HISTORY OF LOBULAR CARCINOMA IN SITU (LCIS) OF BREAST: ICD-10-CM

## 2024-02-01 DIAGNOSIS — N95.0 POST-MENOPAUSAL BLEEDING: ICD-10-CM

## 2024-02-01 PROCEDURE — 99215 OFFICE O/P EST HI 40 MIN: CPT | Performed by: STUDENT IN AN ORGANIZED HEALTH CARE EDUCATION/TRAINING PROGRAM

## 2024-02-01 PROCEDURE — 1036F TOBACCO NON-USER: CPT | Performed by: STUDENT IN AN ORGANIZED HEALTH CARE EDUCATION/TRAINING PROGRAM

## 2024-02-01 ASSESSMENT — ENCOUNTER SYMPTOMS
MUSCULOSKELETAL NEGATIVE: 0
ENDOCRINE NEGATIVE: 0
GASTROINTESTINAL NEGATIVE: 0
PSYCHIATRIC NEGATIVE: 0
RESPIRATORY NEGATIVE: 0
CONSTITUTIONAL NEGATIVE: 0
NEUROLOGICAL NEGATIVE: 0
ALLERGIC/IMMUNOLOGIC NEGATIVE: 0
CARDIOVASCULAR NEGATIVE: 0
HEMATOLOGIC/LYMPHATIC NEGATIVE: 0
EYES NEGATIVE: 0
ROS GI COMMENTS: LEAKING URINE

## 2024-02-01 ASSESSMENT — PAIN SCALES - GENERAL: PAINLEVEL: 0-NO PAIN

## 2024-02-01 NOTE — PROGRESS NOTES
"Division of Minimally Invasive Gynecologic Surgery  [facility]    24 Gynecology Consult     HISTORY OF PRESENT ILLNESS:  Leyla Barillas 53 y.o. P4 ( x4) referred by Dr. Redmond for atypical endometrial hyperplasia (EIN).     Imaging:   - Pelvic US 2023 showed uterus measuring 11cm x 7cm x 7cm, EMS 16mm, otherwise unremarkable.   Labs:   - Recent CBC WNL   Screening:   - Last pap negative/negative 2023   - Last mammogram 2023 BIRADS 1  - EMC w/ atypical endometrial hyperplasia in a background of disordered proliferative endometrium   - Colonoscopy , 5 year follow up recommended   PMHx: LCIS (on Tamoxifen x 5 years, stopped in May 2023)   PSHx: hysteroscopy/D+C, breast reduction, neuroma removal, Bartholin's cyst, tonsillectomy     PAST MEDICAL HISTORY:  Past Medical History:   Diagnosis Date    Arthritis     History of lobular carcinoma in situ (LCIS) of breast     s/p 5 years of Tamoxifen     PAST SURGICAL HISTORY:  Past Surgical History:   Procedure Laterality Date    BREAST SURGERY  2018    Breast Surgery Reduction Procedure Bilateral    FOOT SURGERY  2015    Foot Repair    HYSTEROSCOPY      OTHER SURGICAL HISTORY  2015    Excision Of Bartholin's Gland Or Cyst    TONSILLECTOMY  2015    Tonsillectomy     PHYSICAL EXAMINATION:  VITAL SIGNS: /80   Pulse 75   Ht 1.626 m (5' 4\")   Wt 94.3 kg (208 lb)   LMP 2021   BMI 35.70 kg/m²      Constitutional:  No acute distress, well-nourished and well-developed  HEENT: EOM grossly intact, MMM, neck supple and with full ROM  Pulm:  Effort normal. No accessory muscle usage.  No respiratory distress.  Neurological:  She is alert and oriented to person place and time.  Skin: Warm, no pallor.  Psychiatric:  She has normal mood and affect.    ASSESSMENT:  Leyla Barillas 53 y.o. P4 ( x4) referred by Dr. Redmond for atypical endometrial hyperplasia (EIN).     We discussed the etiology and " implication of atypical hyperplasia today. We reviewed that while progesterones and regular endometrial sampling are an option, the recommendation in her case would be for total hysterectomy. We discussed the option of ovarian retention vs oophorectomy.     We discussed the standard procedure for laparoscopic hysterectomy, including contained morcellation. We reviewed the risks/benefits/alternatives to surgery. She is aware of the risk of bleeding, infection, and damage to nearby structures, including bladder, ureters, bowel, and vasculature. She is agreeable to blood transfusion if recommended. We discussed the risks/benefits of contained morcellation vs laparotomy, and she is comfortable with proceeding with Pfannenstiel if needed for intact removal. We reviewed the small risk of laparotomy and the fact that fertility following hysterectomy is not an option.     She reports she is meeting w/ a GYN surgeon at [facility] as well and while she plans to have a hysterectomy, she isn't sure yet where she'll go. I expressed that the most important thing is her safety and well being and encouraged her to go wherever she felt safest. If she elects to proceed w/ surgery w/ me, she will let me know    PLAN:  - Considering TL-BS (possible BSO) at  vs [facility]   - Will let me know when she makes a decision     Johnna Oliva MD  02/01/24  9:55 AM

## 2024-07-19 ENCOUNTER — HOSPITAL ENCOUNTER (OUTPATIENT)
Dept: RADIOLOGY | Facility: CLINIC | Age: 54
Discharge: HOME | End: 2024-07-19
Payer: COMMERCIAL

## 2024-07-19 VITALS — HEIGHT: 64 IN | BODY MASS INDEX: 35.49 KG/M2 | WEIGHT: 207.89 LBS

## 2024-07-19 DIAGNOSIS — D05.01 LOBULAR CARCINOMA IN SITU OF RIGHT BREAST: ICD-10-CM

## 2024-07-19 PROCEDURE — 77063 BREAST TOMOSYNTHESIS BI: CPT

## 2024-07-29 ENCOUNTER — OFFICE VISIT (OUTPATIENT)
Dept: HEMATOLOGY/ONCOLOGY | Facility: CLINIC | Age: 54
End: 2024-07-29
Payer: COMMERCIAL

## 2024-07-29 VITALS
SYSTOLIC BLOOD PRESSURE: 118 MMHG | WEIGHT: 208.8 LBS | TEMPERATURE: 98.1 F | DIASTOLIC BLOOD PRESSURE: 82 MMHG | BODY MASS INDEX: 35.82 KG/M2 | HEART RATE: 86 BPM | OXYGEN SATURATION: 97 % | RESPIRATION RATE: 16 BRPM

## 2024-07-29 DIAGNOSIS — Z86.000 HISTORY OF LOBULAR CARCINOMA IN SITU (LCIS) OF BREAST: Primary | ICD-10-CM

## 2024-07-29 PROCEDURE — 1036F TOBACCO NON-USER: CPT | Performed by: NURSE PRACTITIONER

## 2024-07-29 PROCEDURE — 99214 OFFICE O/P EST MOD 30 MIN: CPT | Performed by: NURSE PRACTITIONER

## 2024-07-29 ASSESSMENT — PAIN SCALES - GENERAL: PAINLEVEL: 0-NO PAIN

## 2024-07-29 NOTE — PROGRESS NOTES
"Patient ID: Leyla Barillas is a 53 y.o. female.    Cancer History:      Treatment History:    1. Normal mammogram with tomosynthesis with scattered fibroglandular tissue 6/10/17  2. Breast reduction surgery 11/20/17 that showed right breast focal LCIS in several sections  3. Tamoxifen initiated 3/1/18  4. 12/4/18 LEFT BREAST, NON MASS ENHANCEMENT CENTRAL SUPERIOR MIDDLE DEPTH, MRI GUIDED CORE NEEDLE BIOPSY: -- CALCIFIED FAT NECROSIS.   5. Completed 5 yrs of tamoxifen ~ 5/2023   6. May 2024 hysterectomy and BSO.  Atypical endometrial hyperplasia on preoperative biopsy, benign uterine pathology        Subjective    HPI  Seen in follow up for her history of LCIS.   Had D&C in January that showed \"pre-cancer\". In May, had complete hysterectomy and BSO.     Energy and appetite fine.  Denies persistent or worsening cough.  Denies n/v.    Denies lumps in chest.    Objective    BSA: 2.07 meters squared  /82 (BP Location: Left arm, Patient Position: Sitting)   Pulse 86   Temp 36.7 °C (98.1 °F) (Temporal)   Resp 16   Wt 94.7 kg (208 lb 12.8 oz)   LMP 05/01/2021   SpO2 97%   BMI 35.82 kg/m²      Physical Exam  Vitals reviewed.   Eyes:      General: No scleral icterus.  Cardiovascular:      Rate and Rhythm: Normal rate and regular rhythm.   Pulmonary:      Effort: Pulmonary effort is normal.      Breath sounds: Normal breath sounds.   Abdominal:      General: Bowel sounds are normal. There is no distension.      Palpations: Abdomen is soft.      Tenderness: There is no abdominal tenderness. There is no guarding.   Musculoskeletal:      Right lower leg: No edema.      Left lower leg: No edema.   Lymphadenopathy:      Cervical: No cervical adenopathy.      Comments: No adenopathy head/neck/axilla/clavicular    Post reduction bilaterally  No lumps or nodules appreciated    Skin:     General: Skin is warm and dry.   Neurological:      Mental Status: She is alert and oriented to person, place, and time.   Psychiatric:    "      Mood and Affect: Mood normal.         Behavior: Behavior normal.         Performance Status:  Asymptomatic    BI mammo bilateral screening tomosynthesis    Result Date: 7/25/2024  Impression: No mammographic evidence of malignancy.   BI-RADS CATEGORY: BI-RADS Category:  1 Negative. Recommendation:  Annual Screening. Recommended Date:  1 Year. Laterality:  Bilateral.       For any future breast imaging appointments, please call [phone]).   I personally reviewed the images/study and I agree with Arabella Dominguez DO's (radiology resident) findings as stated. This study was interpreted at [place], [place].   MACRO: None   Signed by: Constantine Conteh 7/25/2024 4:07 PM Dictation workstation:   [id number]    12/11/23 MRI breasts: no evidence of malignancy.       Assessment/Plan   Over 6 yrs out from LCIS.     Recent mammogram benign.     Patient would also like to continue with enhanced screening with MRI breasts. I ordered one for December. Thereafter, patient can follow up with GYN or PCP going forward for mammogram/exam/MRI. She can follow up with me as needed.     Recommended a diet rich in a variety of vegetables. Limit red meat. Limit sugar intake.   Encouraged exercise. Aim for at least 150 min of exercise per week.   Limit alcohol intake to 1 or zero servings per day.      I recommend that she NOT take hormone therapy given history of LCIS (she inquired due to BSO).     Dr. Ana Frazier is her PCP at [facility].  Dr. Redmond [place]yn [place] at .           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

## 2024-11-24 ENCOUNTER — ANCILLARY PROCEDURE (OUTPATIENT)
Dept: URGENT CARE | Age: 54
End: 2024-11-24
Payer: COMMERCIAL

## 2024-11-24 ENCOUNTER — OFFICE VISIT (OUTPATIENT)
Dept: URGENT CARE | Age: 54
End: 2024-11-24
Payer: COMMERCIAL

## 2024-11-24 VITALS
DIASTOLIC BLOOD PRESSURE: 66 MMHG | TEMPERATURE: 98.3 F | BODY MASS INDEX: 34.83 KG/M2 | RESPIRATION RATE: 16 BRPM | OXYGEN SATURATION: 98 % | SYSTOLIC BLOOD PRESSURE: 115 MMHG | HEART RATE: 68 BPM | WEIGHT: 204 LBS | HEIGHT: 64 IN

## 2024-11-24 DIAGNOSIS — S60.222A CONTUSION OF LEFT HAND, INITIAL ENCOUNTER: ICD-10-CM

## 2024-11-24 DIAGNOSIS — S60.222A CONTUSION OF LEFT HAND, INITIAL ENCOUNTER: Primary | ICD-10-CM

## 2024-11-24 PROCEDURE — 73130 X-RAY EXAM OF HAND: CPT | Mod: LEFT SIDE | Performed by: FAMILY MEDICINE

## 2024-11-24 NOTE — PROGRESS NOTES
"Subjective   Patient ID: Leyla Barillas is a 53 y.o. female. They present today with a chief complaint of Injury (Left hand swelling and painful, happened yesterday. ).    History of Present Illness  HPI  Patient presents with injury to her left hand after falling on it at home yesterday.  She states she tripped and fell and landed on her flexed wrist.  Pain is primarily over the dorsal hand with swelling.   is restricted secondary to the swelling and pain.  She denies numbness.  No cuts or abrasions.  She has taken ibuprofen for the discomfort  Past Medical History  Allergies as of 11/24/2024    (No Known Allergies)       (Not in a hospital admission)       Past Medical History:   Diagnosis Date    Arthritis     History of lobular carcinoma in situ (LCIS) of breast     s/p 5 years of Tamoxifen    Lobular carcinoma in situ 11/2017       Past Surgical History:   Procedure Laterality Date    BREAST BIOPSY Left 12/04/2018 2018 left breast benign core biopsy with clip placement    BREAST SURGERY  11/01/2017 2017 Breast Surgery Reduction Procedure Bilateral    FOOT SURGERY  03/19/2015    Foot Repair    HYSTERECTOMY  05/2024    HYSTEROSCOPY      OTHER SURGICAL HISTORY  03/19/2015    Excision Of Bartholin's Gland Or Cyst    REDUCTION MAMMAPLASTY  11/2017    TONSILLECTOMY  03/19/2015    Tonsillectomy        reports that she has never smoked. She has never used smokeless tobacco. She reports current alcohol use. She reports that she does not use drugs.    Review of Systems  Review of Systems     As in history of present illness                          Objective    Vitals:    11/24/24 0806   BP: 115/66   BP Location: Left arm   Patient Position: Sitting   Pulse: 68   Resp: 16   Temp: 36.8 °C (98.3 °F)   TempSrc: Oral   SpO2: 98%   Weight: 92.5 kg (204 lb)   Height: 1.626 m (5' 4\")     Patient's last menstrual period was 05/01/2021.    Physical Exam  General: Vitals noted, no distress. Afebrile.   Vascular: Extremity " warm and dry with good peripheral pulses noted  Extremities: No peripheral edema.  4/5  strength on left compared to right.  Swelling and tenderness noted over dorsal left hand.  Skin: No rash. No bruising or discoloration. No cuts or abrasions  Neuro: No focal neurologic deficits, NIH score of 0.    Procedures    Point of Care Test & Imaging Results from this visit  No results found for this visit on 11/24/24.   No results found.  X-ray of left hand shows no fracture or dislocation.  Diagnostic study results (if any) were reviewed by Benito Cage MD.    Assessment/Plan   Allergies, medications, history, and pertinent labs/EKGs/Imaging reviewed by Benito Cage MD.     Medical Decision Making  At time of discharge patient was clinically well-appearing and HDS for outpatient management. The patient and/or family was educated regarding diagnosis, supportive care, OTC and Rx medications. The patient and/or family was given the opportunity to ask questions prior to discharge.  They verbalized understanding of my discussion of the plans for treatment, expected course, indications to return to  or seek further evaluation in ED, and the need for timely follow up as directed.   They were provided with a work/school excuse if requested.    Orders and Diagnoses  Diagnoses and all orders for this visit:  Contusion of left hand, initial encounter  -     XR hand left 3+ views; Future      Medical Admin Record      Patient disposition: Home    Electronically signed by Benito Cage MD  8:35 AM

## 2024-11-24 NOTE — PATIENT INSTRUCTIONS
Elevate and rest hand as able   apply ice several times a day    Tylenol or ibuprofen as needed for pain  See PCP in 1 to 2 weeks if no improvement

## 2024-12-17 ENCOUNTER — HOSPITAL ENCOUNTER (OUTPATIENT)
Dept: RADIOLOGY | Facility: CLINIC | Age: 54
Discharge: HOME | End: 2024-12-17
Payer: COMMERCIAL

## 2024-12-17 DIAGNOSIS — Z86.000 HISTORY OF LOBULAR CARCINOMA IN SITU (LCIS) OF BREAST: ICD-10-CM

## 2024-12-17 PROCEDURE — 77049 MRI BREAST C-+ W/CAD BI: CPT

## 2024-12-17 PROCEDURE — A9575 INJ GADOTERATE MEGLUMI 0.1ML: HCPCS | Performed by: NURSE PRACTITIONER

## 2024-12-17 PROCEDURE — 77049 MRI BREAST C-+ W/CAD BI: CPT | Performed by: STUDENT IN AN ORGANIZED HEALTH CARE EDUCATION/TRAINING PROGRAM

## 2024-12-17 PROCEDURE — 2550000001 HC RX 255 CONTRASTS: Performed by: NURSE PRACTITIONER

## 2024-12-17 RX ORDER — GADOTERATE MEGLUMINE 376.9 MG/ML
18 INJECTION INTRAVENOUS
Status: COMPLETED | OUTPATIENT
Start: 2024-12-17 | End: 2024-12-17

## 2025-02-21 ENCOUNTER — ANCILLARY PROCEDURE (OUTPATIENT)
Dept: URGENT CARE | Age: 55
End: 2025-02-21
Payer: COMMERCIAL

## 2025-02-21 ENCOUNTER — OFFICE VISIT (OUTPATIENT)
Dept: URGENT CARE | Age: 55
End: 2025-02-21
Payer: COMMERCIAL

## 2025-02-21 VITALS
DIASTOLIC BLOOD PRESSURE: 78 MMHG | HEIGHT: 64 IN | RESPIRATION RATE: 18 BRPM | WEIGHT: 204 LBS | HEART RATE: 68 BPM | SYSTOLIC BLOOD PRESSURE: 121 MMHG | TEMPERATURE: 97.9 F | BODY MASS INDEX: 34.83 KG/M2 | OXYGEN SATURATION: 98 %

## 2025-02-21 DIAGNOSIS — S89.91XA SHIN INJURY, RIGHT, INITIAL ENCOUNTER: ICD-10-CM

## 2025-02-21 DIAGNOSIS — S89.91XA SHIN INJURY, RIGHT, INITIAL ENCOUNTER: Primary | ICD-10-CM

## 2025-02-21 DIAGNOSIS — S80.811A ABRASION OF MULTIPLE SITES OF RIGHT LOWER EXTREMITY, INITIAL ENCOUNTER: ICD-10-CM

## 2025-02-21 PROCEDURE — 73590 X-RAY EXAM OF LOWER LEG: CPT | Mod: RIGHT SIDE | Performed by: FAMILY MEDICINE

## 2025-02-21 RX ORDER — CEPHALEXIN 500 MG/1
500 CAPSULE ORAL 3 TIMES DAILY
Qty: 30 CAPSULE | Refills: 0 | Status: SHIPPED | OUTPATIENT
Start: 2025-02-21 | End: 2025-03-03

## 2025-02-21 ASSESSMENT — PAIN SCALES - GENERAL: PAINLEVEL_OUTOF10: 3

## 2025-02-21 ASSESSMENT — PATIENT HEALTH QUESTIONNAIRE - PHQ9
2. FEELING DOWN, DEPRESSED OR HOPELESS: NOT AT ALL
SUM OF ALL RESPONSES TO PHQ9 QUESTIONS 1 & 2: 0
1. LITTLE INTEREST OR PLEASURE IN DOING THINGS: NOT AT ALL

## 2025-02-21 ASSESSMENT — ENCOUNTER SYMPTOMS: LEG PAIN: 1

## 2025-02-21 NOTE — PROGRESS NOTES
"Subjective   Patient ID: Leyla Barillas is a 54 y.o. female. She presents today with a chief complaint of Leg Pain (Right leg pain/abrasion /Happened today /\"Patient stepped over something so she didn't get sock wet and foot went in to drain\"). Patient presents with right shin pain and abrasions. She fell into her garage drain this morning. She showered and cleansed the wounds afterwards. She is up to date on Tetanus vaccination.    History of Present Illness    Leg Pain         Past Medical History  Allergies as of 02/21/2025    (No Known Allergies)       (Not in a hospital admission)       Past Medical History:   Diagnosis Date    Arthritis     History of lobular carcinoma in situ (LCIS) of breast     s/p 5 years of Tamoxifen    Lobular carcinoma in situ 11/2017       Past Surgical History:   Procedure Laterality Date    BREAST BIOPSY Left 12/04/2018 2018 left breast benign core biopsy with clip placement    BREAST SURGERY  11/01/2017 2017 Breast Surgery Reduction Procedure Bilateral    FOOT SURGERY  03/19/2015    Foot Repair    HYSTERECTOMY  05/2024    HYSTEROSCOPY      OTHER SURGICAL HISTORY  03/19/2015    Excision Of Bartholin's Gland Or Cyst    REDUCTION MAMMAPLASTY  11/2017    TONSILLECTOMY  03/19/2015    Tonsillectomy        reports that she has never smoked. She has never been exposed to tobacco smoke. She has never used smokeless tobacco. She reports current alcohol use. She reports that she does not use drugs.    Review of Systems  Review of Systems                               Objective    Vitals:    02/21/25 1030   BP: 121/78   BP Location: Left arm   Patient Position: Sitting   Pulse: 68   Resp: 18   Temp: 36.6 °C (97.9 °F)   SpO2: 98%   Weight: 92.5 kg (204 lb)   Height: 1.626 m (5' 4\")     Patient's last menstrual period was 05/01/2021.    Physical Exam  Vitals and nursing note reviewed.   Constitutional:       General: She is not in acute distress.     Appearance: Normal appearance. "   Cardiovascular:      Pulses: Normal pulses.   Musculoskeletal:         General: Normal range of motion.   Skin:     General: Skin is warm and dry.      Capillary Refill: Capillary refill takes less than 2 seconds.      Comments: Right anterior tibial surface with multiple abrasions, mild tenderness to palpation with swelling   Neurological:      Mental Status: She is alert.      Sensory: No sensory deficit.         Procedures    Point of Care Test & Imaging Results from this visit  No results found for this visit on 02/21/25.   No results found.    Diagnostic study results (if any) were reviewed by Krista Leon MD.    Assessment/Plan   Allergies, medications, history, and pertinent labs/EKGs/Imaging reviewed by Krista Leon MD.     Medical Decision Making      Orders and Diagnoses  There are no diagnoses linked to this encounter.    Medical Admin Record      Patient disposition: Home    Electronically signed by Krista Leon MD  10:47 AM

## 2025-02-21 NOTE — PATIENT INSTRUCTIONS
Antibiotics as directed; take with food  Wash wounds gently with antibacterial soap and water twice daily, followed by application of antibiotic ointment and dressing  Apply ice as needed  Follow up with new or worsening symptoms

## 2025-10-03 ENCOUNTER — APPOINTMENT (OUTPATIENT)
Facility: CLINIC | Age: 55
End: 2025-10-03
Payer: COMMERCIAL

## (undated) DEVICE — BRIEF, CURITY, XLARGE, MESH

## (undated) DEVICE — ACCESSORY, AVETA, WASTE MANAGEMENT WITH CAP

## (undated) DEVICE — PAD, SANITARY, OBSTETRICAL, W/ADHSV STRIP,11 IN,LF

## (undated) DEVICE — Device

## (undated) DEVICE — GLOVE, SURGICAL, PROTEXIS PI W/NEU-THERA, 6.0, PF, LF

## (undated) DEVICE — GOWN, ASTOUND, L

## (undated) DEVICE — SOLUTION, SCRUB EXIDINE, 4% CHG, 4 OZ

## (undated) DEVICE — ACCESSORY, FLUID MANAGEMENT, AVETA

## (undated) DEVICE — DEVICE, RESECTING, AVETA FLEX, 2.9MM